# Patient Record
Sex: MALE | Race: WHITE | NOT HISPANIC OR LATINO
[De-identification: names, ages, dates, MRNs, and addresses within clinical notes are randomized per-mention and may not be internally consistent; named-entity substitution may affect disease eponyms.]

---

## 2020-10-25 ENCOUNTER — APPOINTMENT (OUTPATIENT)
Dept: DISASTER EMERGENCY | Facility: CLINIC | Age: 55
End: 2020-10-25

## 2020-10-25 DIAGNOSIS — Z01.818 ENCOUNTER FOR OTHER PREPROCEDURAL EXAMINATION: ICD-10-CM

## 2020-10-25 PROBLEM — Z00.00 ENCOUNTER FOR PREVENTIVE HEALTH EXAMINATION: Status: ACTIVE | Noted: 2020-10-25

## 2020-10-26 VITALS
TEMPERATURE: 98 F | SYSTOLIC BLOOD PRESSURE: 121 MMHG | DIASTOLIC BLOOD PRESSURE: 56 MMHG | HEART RATE: 66 BPM | RESPIRATION RATE: 16 BRPM | WEIGHT: 192.02 LBS | OXYGEN SATURATION: 97 % | HEIGHT: 68 IN

## 2020-10-26 LAB — SARS-COV-2 N GENE NPH QL NAA+PROBE: NOT DETECTED

## 2020-10-26 NOTE — H&P ADULT - NSHPLABSRESULTS_GEN_ALL_CORE
EK.4   8.68  )-----------( 211      ( 28 Oct 2020 09:11 )             44.0     PT/INR - ( 28 Oct 2020 09:11 )   PT: 11.2 sec;   INR: 0.93          PTT - ( 28 Oct 2020 09:11 )  PTT:31.4 sec    10-28    138  |  100  |  11  ----------------------------<  275<H>  4.5   |  27  |  0.64    Ca    9.0      28 Oct 2020 09:11    TPro  7.1  /  Alb  4.4  /  TBili  0.3  /  DBili  x   /  AST  15  /  ALT  18  /  AlkPhos  70  10-28 EKG: SR, TWI in III                          14.4   8.68  )-----------( 211      ( 28 Oct 2020 09:11 )             44.0     PT/INR - ( 28 Oct 2020 09:11 )   PT: 11.2 sec;   INR: 0.93          PTT - ( 28 Oct 2020 09:11 )  PTT:31.4 sec    10-28    138  |  100  |  11  ----------------------------<  275<H>  4.5   |  27  |  0.64    Ca    9.0      28 Oct 2020 09:11    TPro  7.1  /  Alb  4.4  /  TBili  0.3  /  DBili  x   /  AST  15  /  ALT  18  /  AlkPhos  70  10-28

## 2020-10-26 NOTE — H&P ADULT - RS GEN PE MLT RESP DETAILS PC
respirations non-labored/clear to auscultation bilaterally/no chest wall tenderness/no intercostal retractions/no rales/no rhonchi/no wheezes

## 2020-10-26 NOTE — H&P ADULT - ASSESSMENT
54 y/o male, current smoker, with PMHx significant for HTN, HLD, DM-II, who in light of the pt's risk factors, CCS Class II Anginal symptoms, and recent abnormal NST, pt is recommended for cardiac cath with possible intervention for suspected CAD.     ASA Class II  Mallampati Class III    Precath consent obtained and in chart    Loaded with ASA 325mg PO x 1 and Plavix 600mg PO x 1 prior to cath  IVF NS @ 100cc/hr    Risks & benefits of procedure and alternative therapy have been explained to the patient including but not limited to: allergic reaction, bleeding with possible need for blood transfusion, infection, renal and vascular compromise, limb damage, arrhythmia, stroke, vessel dissection/perforation, Myocardial infarction, emergent CABG. Informed consent obtained and in chart.       Patient a candidate for sedation: Yes    Sedation Type: Moderate

## 2020-10-26 NOTE — H&P ADULT - GASTROINTESTINAL
Soft, non-tender, no hepatosplenomegaly, normal bowel sounds

## 2020-10-26 NOTE — H&P ADULT - HISTORY OF PRESENT ILLNESS
Covid test  Pharmacy  Escort:    54 y/o male with PMHx     NST 10/10/20; 60%, Stress test adequate, hypertensive blood pressure response during stress.  Stress ST response normal.  CP did not occur. LV myocardial perfusion was abnormal, LV myocardial perfusion was consistent with 2V CAD.  Global Stress LV function was normal.    LV volume is mildly enlarged. LV regional wall motion was abnormal, LV wall thickening was abnormal.   Global  rest LV function and volume normal.  Rest regional wall thickening was abnormal. Scan significance was abnormal and indicated a moderate to high risk for hard cardiac events.  LV dilation normal.   Covid test  Pharmacy  Escort:    Skeleton    56 y/o male with PMHx     NST 10/10/20; 60%, Stress test adequate, hypertensive blood pressure response during stress.  Stress ST response normal.  CP did not occur. LV myocardial perfusion was abnormal, LV myocardial perfusion was consistent with 2V CAD.  Global Stress LV function was normal.    LV volume is mildly enlarged. LV regional wall motion was abnormal, LV wall thickening was abnormal.   Global  rest LV function and volume normal.  Rest regional wall thickening was abnormal. Scan significance was abnormal and indicated a moderate to high risk for hard cardiac events.  LV dilation normal.     Pt denies CP, SOB, palpitation, orthopnea, PND, dizziness, syncope, LE swelling or any other complaints at this time    In light of the pt's risk factors, currently Class ___Anginal symptoms and recent abnormal NST, pt is recommended for [facility] with possible intervention.   Covid test  Pharmacy  Escort:    Skeleton    56 y/o male with PMHx significant for HTN, HLD, DM-II, who presented to his cardiologist, Dr. Tobin, with c/o SOB and intermittent chest discomfort on .............................  Denies any dizziness, n/v, diaphoresis, palpitations, orthopnea, PND, LE edema, syncope.  Subsequently he underwent a NST on 10/10/20 which revealed a medium sized, mild to moderate severity, significantly reversible defects in the proximal to distal septal segments and apex; also a small sized, mild severity, completely reversible defect in the proximal inferolateral segment, EF 60%.      In light of the pt's risk factors, CCS Class ___Anginal symptoms, and recent abnormal NST, pt is recommended for cardiac cath with possible intervention for suspected CAD.  Covid test 10/25 NEG ([facility]IE)  Pharmacy  [address], [place] [phone]  Escort: wife    56 y/o male, current smoker, with PMHx significant for HTN, HLD, DM-II, who presented to his cardiologist, Dr. Tobin, with c/o MICHELLE, intermittent chest tightness, and increased fatigue on climbing 3-4 flights of stairs, relieved with rest, x 4 mos.  Denies any dizziness, n/v, diaphoresis, palpitations, orthopnea, PND, LE edema, syncope.  Subsequently he underwent a NST on 10/10/20 which revealed a medium sized, mild to moderate severity, significantly reversible defects in the proximal to distal septal segments and apex; also a small sized, mild severity, completely reversible defect in the proximal inferolateral segment, EF 60%.      In light of the pt's risk factors, CCS Class II Anginal symptoms, and recent abnormal NST, pt is recommended for cardiac cath with possible intervention for suspected CAD.

## 2020-10-26 NOTE — H&P ADULT - NSICDXPASTMEDICALHX_GEN_ALL_CORE_FT
PAST MEDICAL HISTORY:  DM type 2 (diabetes mellitus, type 2)     HLD (hyperlipidemia)     HTN (hypertension)

## 2020-10-27 RX ORDER — METFORMIN HYDROCHLORIDE 850 MG/1
1 TABLET ORAL
Qty: 0 | Refills: 0 | DISCHARGE

## 2020-10-28 ENCOUNTER — OUTPATIENT (OUTPATIENT)
Dept: OUTPATIENT SERVICES | Facility: HOSPITAL | Age: 55
LOS: 1 days | Discharge: HOME | End: 2020-10-28
Payer: COMMERCIAL

## 2020-10-28 DIAGNOSIS — Z98.890 OTHER SPECIFIED POSTPROCEDURAL STATES: Chronic | ICD-10-CM

## 2020-10-28 LAB
A1C WITH ESTIMATED AVERAGE GLUCOSE RESULT: 10 % — HIGH (ref 4–5.6)
ALBUMIN SERPL ELPH-MCNC: 4.4 G/DL — SIGNIFICANT CHANGE UP (ref 3.3–5)
ALP SERPL-CCNC: 70 U/L — SIGNIFICANT CHANGE UP (ref 40–120)
ALT FLD-CCNC: 18 U/L — SIGNIFICANT CHANGE UP (ref 10–45)
ANION GAP SERPL CALC-SCNC: 11 MMOL/L — SIGNIFICANT CHANGE UP (ref 5–17)
APTT BLD: 31.4 SEC — SIGNIFICANT CHANGE UP (ref 27.5–35.5)
AST SERPL-CCNC: 15 U/L — SIGNIFICANT CHANGE UP (ref 10–40)
BASOPHILS # BLD AUTO: 0.07 K/UL — SIGNIFICANT CHANGE UP (ref 0–0.2)
BASOPHILS NFR BLD AUTO: 0.8 % — SIGNIFICANT CHANGE UP (ref 0–2)
BILIRUB SERPL-MCNC: 0.3 MG/DL — SIGNIFICANT CHANGE UP (ref 0.2–1.2)
BUN SERPL-MCNC: 11 MG/DL — SIGNIFICANT CHANGE UP (ref 7–23)
CALCIUM SERPL-MCNC: 9 MG/DL — SIGNIFICANT CHANGE UP (ref 8.4–10.5)
CHLORIDE SERPL-SCNC: 100 MMOL/L — SIGNIFICANT CHANGE UP (ref 96–108)
CHOLEST SERPL-MCNC: 154 MG/DL — SIGNIFICANT CHANGE UP
CK MB CFR SERPL CALC: 3.1 NG/ML — SIGNIFICANT CHANGE UP (ref 0–6.7)
CK SERPL-CCNC: 224 U/L — HIGH (ref 30–200)
CO2 SERPL-SCNC: 27 MMOL/L — SIGNIFICANT CHANGE UP (ref 22–31)
CREAT SERPL-MCNC: 0.64 MG/DL — SIGNIFICANT CHANGE UP (ref 0.5–1.3)
EOSINOPHIL # BLD AUTO: 0.14 K/UL — SIGNIFICANT CHANGE UP (ref 0–0.5)
EOSINOPHIL NFR BLD AUTO: 1.6 % — SIGNIFICANT CHANGE UP (ref 0–6)
ESTIMATED AVERAGE GLUCOSE: 240 MG/DL — HIGH (ref 68–114)
GLUCOSE BLDC GLUCOMTR-MCNC: 198 MG/DL — HIGH (ref 70–99)
GLUCOSE BLDC GLUCOMTR-MCNC: 208 MG/DL — HIGH (ref 70–99)
GLUCOSE SERPL-MCNC: 275 MG/DL — HIGH (ref 70–99)
HCT VFR BLD CALC: 44 % — SIGNIFICANT CHANGE UP (ref 39–50)
HDLC SERPL-MCNC: 39 MG/DL — LOW
HGB BLD-MCNC: 14.4 G/DL — SIGNIFICANT CHANGE UP (ref 13–17)
IMM GRANULOCYTES NFR BLD AUTO: 0.3 % — SIGNIFICANT CHANGE UP (ref 0–1.5)
INR BLD: 0.93 — SIGNIFICANT CHANGE UP (ref 0.88–1.16)
LIPID PNL WITH DIRECT LDL SERPL: 77 MG/DL — SIGNIFICANT CHANGE UP
LYMPHOCYTES # BLD AUTO: 2.4 K/UL — SIGNIFICANT CHANGE UP (ref 1–3.3)
LYMPHOCYTES # BLD AUTO: 27.6 % — SIGNIFICANT CHANGE UP (ref 13–44)
MCHC RBC-ENTMCNC: 27.7 PG — SIGNIFICANT CHANGE UP (ref 27–34)
MCHC RBC-ENTMCNC: 32.7 GM/DL — SIGNIFICANT CHANGE UP (ref 32–36)
MCV RBC AUTO: 84.6 FL — SIGNIFICANT CHANGE UP (ref 80–100)
MONOCYTES # BLD AUTO: 0.61 K/UL — SIGNIFICANT CHANGE UP (ref 0–0.9)
MONOCYTES NFR BLD AUTO: 7 % — SIGNIFICANT CHANGE UP (ref 2–14)
NEUTROPHILS # BLD AUTO: 5.43 K/UL — SIGNIFICANT CHANGE UP (ref 1.8–7.4)
NEUTROPHILS NFR BLD AUTO: 62.7 % — SIGNIFICANT CHANGE UP (ref 43–77)
NON HDL CHOLESTEROL: 115 MG/DL — SIGNIFICANT CHANGE UP
NRBC # BLD: 0 /100 WBCS — SIGNIFICANT CHANGE UP (ref 0–0)
PLATELET # BLD AUTO: 211 K/UL — SIGNIFICANT CHANGE UP (ref 150–400)
POTASSIUM SERPL-MCNC: 4.5 MMOL/L — SIGNIFICANT CHANGE UP (ref 3.5–5.3)
POTASSIUM SERPL-SCNC: 4.5 MMOL/L — SIGNIFICANT CHANGE UP (ref 3.5–5.3)
PROT SERPL-MCNC: 7.1 G/DL — SIGNIFICANT CHANGE UP (ref 6–8.3)
PROTHROM AB SERPL-ACNC: 11.2 SEC — SIGNIFICANT CHANGE UP (ref 10.6–13.6)
RBC # BLD: 5.2 M/UL — SIGNIFICANT CHANGE UP (ref 4.2–5.8)
RBC # FLD: 13.2 % — SIGNIFICANT CHANGE UP (ref 10.3–14.5)
SODIUM SERPL-SCNC: 138 MMOL/L — SIGNIFICANT CHANGE UP (ref 135–145)
TRIGL SERPL-MCNC: 191 MG/DL — HIGH
WBC # BLD: 8.68 K/UL — SIGNIFICANT CHANGE UP (ref 3.8–10.5)
WBC # FLD AUTO: 8.68 K/UL — SIGNIFICANT CHANGE UP (ref 3.8–10.5)

## 2020-10-28 PROCEDURE — 83036 HEMOGLOBIN GLYCOSYLATED A1C: CPT

## 2020-10-28 PROCEDURE — 93458 L HRT ARTERY/VENTRICLE ANGIO: CPT | Mod: 26

## 2020-10-28 PROCEDURE — C1769: CPT

## 2020-10-28 PROCEDURE — 93458 L HRT ARTERY/VENTRICLE ANGIO: CPT

## 2020-10-28 PROCEDURE — 93010 ELECTROCARDIOGRAM REPORT: CPT

## 2020-10-28 PROCEDURE — 36415 COLL VENOUS BLD VENIPUNCTURE: CPT

## 2020-10-28 PROCEDURE — 80053 COMPREHEN METABOLIC PANEL: CPT

## 2020-10-28 PROCEDURE — 80061 LIPID PANEL: CPT

## 2020-10-28 PROCEDURE — 85610 PROTHROMBIN TIME: CPT

## 2020-10-28 PROCEDURE — 93005 ELECTROCARDIOGRAM TRACING: CPT

## 2020-10-28 PROCEDURE — 99202 OFFICE O/P NEW SF 15 MIN: CPT

## 2020-10-28 PROCEDURE — 82553 CREATINE MB FRACTION: CPT

## 2020-10-28 PROCEDURE — 82962 GLUCOSE BLOOD TEST: CPT

## 2020-10-28 PROCEDURE — 85730 THROMBOPLASTIN TIME PARTIAL: CPT

## 2020-10-28 PROCEDURE — C1894: CPT

## 2020-10-28 PROCEDURE — 85025 COMPLETE CBC W/AUTO DIFF WBC: CPT

## 2020-10-28 PROCEDURE — 82550 ASSAY OF CK (CPK): CPT

## 2020-10-28 PROCEDURE — C1887: CPT

## 2020-10-28 RX ORDER — DEXTROSE 50 % IN WATER 50 %
25 SYRINGE (ML) INTRAVENOUS ONCE
Refills: 0 | Status: DISCONTINUED | OUTPATIENT
Start: 2020-10-28 | End: 2020-10-28

## 2020-10-28 RX ORDER — INSULIN LISPRO 100/ML
VIAL (ML) SUBCUTANEOUS ONCE
Refills: 0 | Status: COMPLETED | OUTPATIENT
Start: 2020-10-28 | End: 2020-10-28

## 2020-10-28 RX ORDER — SODIUM CHLORIDE 9 MG/ML
1000 INJECTION INTRAMUSCULAR; INTRAVENOUS; SUBCUTANEOUS
Refills: 0 | Status: DISCONTINUED | OUTPATIENT
Start: 2020-10-28 | End: 2020-10-28

## 2020-10-28 RX ORDER — GLUCAGON INJECTION, SOLUTION 0.5 MG/.1ML
1 INJECTION, SOLUTION SUBCUTANEOUS ONCE
Refills: 0 | Status: DISCONTINUED | OUTPATIENT
Start: 2020-10-28 | End: 2020-11-11

## 2020-10-28 RX ORDER — FENOFIBRATE,MICRONIZED 130 MG
1 CAPSULE ORAL
Qty: 0 | Refills: 0 | DISCHARGE

## 2020-10-28 RX ORDER — DEXTROSE 50 % IN WATER 50 %
25 SYRINGE (ML) INTRAVENOUS ONCE
Refills: 0 | Status: DISCONTINUED | OUTPATIENT
Start: 2020-10-28 | End: 2020-11-11

## 2020-10-28 RX ORDER — GLUCAGON INJECTION, SOLUTION 0.5 MG/.1ML
1 INJECTION, SOLUTION SUBCUTANEOUS ONCE
Refills: 0 | Status: DISCONTINUED | OUTPATIENT
Start: 2020-10-28 | End: 2020-10-28

## 2020-10-28 RX ORDER — ATORVASTATIN CALCIUM 80 MG/1
1 TABLET, FILM COATED ORAL
Qty: 0 | Refills: 0 | DISCHARGE

## 2020-10-28 RX ORDER — METFORMIN HYDROCHLORIDE 850 MG/1
1 TABLET ORAL
Qty: 0 | Refills: 0 | DISCHARGE

## 2020-10-28 RX ORDER — SODIUM CHLORIDE 9 MG/ML
1000 INJECTION, SOLUTION INTRAVENOUS
Refills: 0 | Status: DISCONTINUED | OUTPATIENT
Start: 2020-10-28 | End: 2020-10-28

## 2020-10-28 RX ORDER — SODIUM CHLORIDE 9 MG/ML
1000 INJECTION, SOLUTION INTRAVENOUS
Refills: 0 | Status: DISCONTINUED | OUTPATIENT
Start: 2020-10-28 | End: 2020-11-11

## 2020-10-28 RX ORDER — DEXTROSE 50 % IN WATER 50 %
15 SYRINGE (ML) INTRAVENOUS ONCE
Refills: 0 | Status: DISCONTINUED | OUTPATIENT
Start: 2020-10-28 | End: 2020-10-28

## 2020-10-28 RX ORDER — CHLORHEXIDINE GLUCONATE 213 G/1000ML
1 SOLUTION TOPICAL ONCE
Refills: 0 | Status: DISCONTINUED | OUTPATIENT
Start: 2020-10-28 | End: 2020-10-28

## 2020-10-28 RX ORDER — LINAGLIPTIN AND METFORMIN HYDROCHLORIDE 2.5; 85 MG/1; MG/1
1 TABLET, FILM COATED ORAL
Qty: 0 | Refills: 0 | DISCHARGE

## 2020-10-28 RX ORDER — DEXTROSE 50 % IN WATER 50 %
15 SYRINGE (ML) INTRAVENOUS ONCE
Refills: 0 | Status: DISCONTINUED | OUTPATIENT
Start: 2020-10-28 | End: 2020-11-11

## 2020-10-28 RX ORDER — CLOPIDOGREL BISULFATE 75 MG/1
600 TABLET, FILM COATED ORAL ONCE
Refills: 0 | Status: COMPLETED | OUTPATIENT
Start: 2020-10-28 | End: 2020-10-28

## 2020-10-28 RX ORDER — LISINOPRIL 2.5 MG/1
1 TABLET ORAL
Qty: 0 | Refills: 0 | DISCHARGE

## 2020-10-28 RX ORDER — DEXTROSE 50 % IN WATER 50 %
12.5 SYRINGE (ML) INTRAVENOUS ONCE
Refills: 0 | Status: DISCONTINUED | OUTPATIENT
Start: 2020-10-28 | End: 2020-10-28

## 2020-10-28 RX ORDER — ESCITALOPRAM OXALATE 10 MG/1
1 TABLET, FILM COATED ORAL
Qty: 0 | Refills: 0 | DISCHARGE

## 2020-10-28 RX ORDER — DEXTROSE 50 % IN WATER 50 %
12.5 SYRINGE (ML) INTRAVENOUS ONCE
Refills: 0 | Status: DISCONTINUED | OUTPATIENT
Start: 2020-10-28 | End: 2020-11-11

## 2020-10-28 RX ORDER — ASPIRIN/CALCIUM CARB/MAGNESIUM 324 MG
325 TABLET ORAL ONCE
Refills: 0 | Status: COMPLETED | OUTPATIENT
Start: 2020-10-28 | End: 2020-10-28

## 2020-10-28 RX ORDER — SODIUM CHLORIDE 9 MG/ML
500 INJECTION INTRAMUSCULAR; INTRAVENOUS; SUBCUTANEOUS
Refills: 0 | Status: DISCONTINUED | OUTPATIENT
Start: 2020-10-28 | End: 2020-11-11

## 2020-10-28 RX ADMIN — Medication 3: at 09:56

## 2020-10-28 RX ADMIN — SODIUM CHLORIDE 100 MILLILITER(S): 9 INJECTION INTRAMUSCULAR; INTRAVENOUS; SUBCUTANEOUS at 09:57

## 2020-10-28 RX ADMIN — CLOPIDOGREL BISULFATE 600 MILLIGRAM(S): 75 TABLET, FILM COATED ORAL at 09:56

## 2020-10-28 RX ADMIN — Medication 2: at 11:58

## 2020-10-28 RX ADMIN — Medication 325 MILLIGRAM(S): at 09:56

## 2020-10-28 NOTE — CONSULT NOTE ADULT - SUBJECTIVE AND OBJECTIVE BOX
Preventive Cardiology Consultation Note  CHIEF COMPLAINT: s/p cardiac catheterization requiring cardiovascular prevention optimization and education  HISTORY OF PRESENT ILLNESS: 56 y/o male, current smoker, with PMHx significant for HTN, HLD, DM-II, who presented to his cardiologist, Dr. Tobin, with c/o MICHELLE, intermittent chest tightness, and increased fatigue on climbing 3-4 flights of stairs, relieved with rest, x 4 mos. Patient is now s/p diagnostic cardiac catheterization 10/28/20 revealing non obstructive CAD with sluggish flow in LAD.  Review of systems otherwise negative.   Lifestyle History: Mediterranean Diet Score (9 question survey) was 4.  (8-9: optimal, 6-7: near-optimal, 4-5: suboptimal, 0-3: markedly suboptimal) Exercise: Patient reports exercising at a moderate level for <30 minutes per week.  Smoking: Current smoker (1/2 PPD x 10-15 yrs --> currently 4 cigs/day). Stress: Patient denies any stress.   PAST MEDICAL & SURGICAL HISTORY: DM type 2 (diabetes mellitus, type 2)  HLD (hyperlipidemia)  HTN (hypertension)  H/O endoscopy 1 month ago  H/O colonoscopy 1 month ago   FAMILY HISTORY:  CAD - grandfather had MI at 55   Allergies:  No Known Allergies   HOME MEDICATIONS:  atorvastatin 20 mg oral tablet: 1 tab(s) orally once a day (28 Oct 2020 09:47) escitalopram 10 mg oral tablet: 1 tab(s) orally once a day (28 Oct 2020 09:47) fenofibrate 54 mg oral tablet: 1 tab(s) orally once a day (28 Oct 2020 09:47) Jentadueto 2.5 mg-1000 mg oral tablet: 1 tab(s) orally once a day (28 Oct 2020 09:47) lisinopril 20 mg oral tablet: 1 tab(s) orally once a day (28 Oct 2020 09:47)   PHYSICAL EXAM: · Temp (F)	97.6 Degrees F · Temp (C)	36.4 Degrees C · Heart Rate	66 /min · Respiration Rate (breaths/min)	16 /min · BP Systolic	121 mm Hg · BP Diastolic	  56 mm Hg · Blood Pressure - Method	electronic · BP Noninvasive Mean	77 mm Hg · SpO2 (%)	97 % · O2 Delivery/Oxygen Delivery Method	room air  Height (cm): 167.6 (10-28 @ 10:59) Weight (kg): 87.1 (10-28 @ 09:50) BMI (kg/m2): 31 (10-28 @ 10:59) 	 Gen- awake, conversive Head-NCAT; eyes: no corneal arcus noted b/l; no xanthelasmas  Neck- no thyromegaly, no cervical LAD, no JVD, no carotid bruit b/l Respiratory- good air entry b/l, no WRR Cardiovascular- S1S2, RRR, no MRG appr, no LE edema b/l, distal pulses 2+ b/l Gastrointestinal- no HSM, no masses Neurology- moves all extremities, no focal deficits Psych- normal affect, non-depressed mood Skin- no lesions, no rashes, no xanthomas   LABS:	                      14.4  8.68  )-----------( 211      ( 28 Oct 2020 09:11 )            44.0   10-28  138  |  100  |  11 ----------------------------<  275<H> 4.5   |  27  |  0.64  Ca    9.0      28 Oct 2020 09:11  TPro  7.1  /  Alb  4.4  /  TBili  0.3  /  DBili  x   /  AST  15  /  ALT  18  /  AlkPhos  70  10-28   Cholesterol, Serum: 154 mg/dL (10-28 @ 09:11) HDL Cholesterol, Serum: 39 mg/dL (10-28 @ 09:11) Triglycerides, Serum: 191 mg/dL (10-28 @ 09:11) LDL, Serum: 77 mg/dL A1c: 10.0%  ASSESSMENT/RECOMMENDATIONS: 	 Patient's dietary, exercise and overall lifestyle habits were reviewed. The concept of atherosclerosis and its systemic nature was discussed with a focus on the need to get all cardiovascular risk factors to goal. At this time, I would like to make the following recommendations to optimize atherosclerotic risk factors.   RECOMMENDATIONS:  Anti-platelet Therapy: APT per interventionalist recommendation.  Lipid Therapy: Patient is currently taking atorvastatin 20mg and fenofibrate 54mg daily. This is a good regimen for him, but after implementing lifestyle modifications, if his TG do lower to normal limits, it would be reasonable to increase his fenofibrate and/or consider a switch from atorvastatin to rosuvastatin, which tends to help lower TG best out of the statins. It is good practice not to increase the statin to max dosage while on concomitant fibrate therapy, so keeping him on a moderate dose statin is good.  Hypertension: Blood pressures during this stay were well-controlled.  Mediterranean Diet Score is 4. Some suggestions include continue incorporating 2 or more servings per day of vegetables, fruits, and whole grains. Increase intake of fish and legumes/beans to 2 or more servings per week. Aim to increase intake of healthy fats, such as olive oil and avocados, and have a handful of nuts/seeds most days. Reduce red/processed meat consumption to 2 or fewer times per week.  Exercise: Recommended gradually increasing activity to 30-45 minutes most days of the week once cleared by referring cardiologist.  Medication Adherence: Patient has no issues with adherence at this time.  Smoking: Smoking cessation was strongly encouraged and discussed with the patient. We suggested picking a day in the future that will be the planned quit date. We recommend following up with his PCP for further assistance, if needed for nicotine replacement therapy. The risks to overall health if he continues to smoke were discussed, and patient expressed understanding.  Obesity/Overweight: The patient was advised about specific mechanisms such as reduced portions and increased activity for efforts toward weight loss.  Glucometabolic State: Patient's blood sugar is not well-controlled on the current regimen at this time. HbA1c today was 10.0%. Depending on discussions with patient's PCP and/or endocrinologist, we would recommend the possibility of a GLP-1 agonist or SGLT-2 inhibitor, as these newer agents have cardiovascular benefits as well as glucose control. The goal is to transition to more guideline-based cardioprotective agents that will reduce the risk of recurrent CV events, while reducing the dependence on insulin.  Sleep Apnea: The patient is at low risk for sleep apnea.  Psychological Stress: The patient appears to be coping with stressors well at this time.   Thank you for the opportunity to see this patient. Please feel free to contact Prevention if there are any questions, or if you feel that your patient would benefit from continued follow-up visits with the Program.  [place], [facility] Cardiovascular Prevention   Audra Marquez MD System Director, Cardiovascular Prevention

## 2020-10-28 NOTE — PROGRESS NOTE ADULT - SUBJECTIVE AND OBJECTIVE BOX
Interventional Cardiology PA SDA Discharge Note    Patient without complaints. Ambulated and voided without difficulties    Afebrile, VSS    Ext: Right Radial: no hematoma, no bleeding, dressing; C/D/I    Pulses: intact RAD/DP/PT to baseline     A/P: 56 y/o male, current smoker, with PMHx significant for HTN, HLD, DM-II, who presented to his cardiologist, Dr. Tobin, with c/o MICHELLE, intermittent chest tightness, and increased fatigue on climbing 3-4 flights of stairs, relieved with rest, x 4 mos.  Denies any dizziness, n/v, diaphoresis, palpitations, orthopnea, PND, LE edema, syncope.  Subsequently he underwent a NST on 10/10/20 which revealed a medium sized, mild to moderate severity, significantly reversible defects in the proximal to distal septal segments and apex; also a small sized, mild severity, completely reversible defect in the proximal inferolateral segment, EF 60%. In light of the pt's risk factors, CCS Class II Anginal symptoms, and recent abnormal NST, pt is recommended for cardiac cath with possible intervention for suspected CAD.   Pt now s/p diagnostic cardiac catheterization 10/28/20 revealing non obstructive CAD, access R radial TR band applied and removed without any complications.    1.	Stable for discharge as per attending Dr. Jean after bed rest, pt voids, wrist stable and 30 minutes of ambulation.  2.	Follow-up with PMD/Cardiologist Crista in 1-2 weeks  3.	Discharged forms signed and copies in chart    Interventional Cardiology PA SDA Discharge Note    Patient without complaints. Ambulated and voided without difficulties    Afebrile, VSS    Ext: Right Radial: no hematoma, no bleeding, dressing; C/D/I    Pulses: intact RAD/DP/PT to baseline     A/P: 56 y/o male, current smoker, with PMHx significant for HTN, HLD, DM-II, who presented to his cardiologist, Dr. Tobin, with c/o MICHELLE, intermittent chest tightness, and increased fatigue on climbing 3-4 flights of stairs, relieved with rest, x 4 mos.  Denies any dizziness, n/v, diaphoresis, palpitations, orthopnea, PND, LE edema, syncope.  Subsequently he underwent a NST on 10/10/20 which revealed a medium sized, mild to moderate severity, significantly reversible defects in the proximal to distal septal segments and apex; also a small sized, mild severity, completely reversible defect in the proximal inferolateral segment, EF 60%. In light of the pt's risk factors, CCS Class II Anginal symptoms, and recent abnormal NST, pt is recommended for cardiac cath with possible intervention for suspected CAD.   Pt now s/p diagnostic cardiac catheterization 10/28/20 revealing non obstructive CAD with sluggish flow in LAD, access R radial TR band applied and removed without any complications.    1.	Stable for discharge as per attending Dr. Jean after bed rest, pt voids, wrist stable and 30 minutes of ambulation.  2.	Follow-up with PMD/Cardiologist Crista in 1-2 weeks  3.	Discharged forms signed and copies in chart

## 2020-11-06 DIAGNOSIS — I25.110 ATHEROSCLEROTIC HEART DISEASE OF NATIVE CORONARY ARTERY WITH UNSTABLE ANGINA PECTORIS: ICD-10-CM

## 2020-11-06 DIAGNOSIS — R94.39 ABNORMAL RESULT OF OTHER CARDIOVASCULAR FUNCTION STUDY: ICD-10-CM

## 2020-11-06 DIAGNOSIS — E78.5 HYPERLIPIDEMIA, UNSPECIFIED: ICD-10-CM

## 2020-11-06 DIAGNOSIS — E11.9 TYPE 2 DIABETES MELLITUS WITHOUT COMPLICATIONS: ICD-10-CM

## 2020-11-06 DIAGNOSIS — I10 ESSENTIAL (PRIMARY) HYPERTENSION: ICD-10-CM

## 2020-11-06 DIAGNOSIS — F17.200 NICOTINE DEPENDENCE, UNSPECIFIED, UNCOMPLICATED: ICD-10-CM

## 2020-11-06 DIAGNOSIS — Z79.84 LONG TERM (CURRENT) USE OF ORAL HYPOGLYCEMIC DRUGS: ICD-10-CM
